# Patient Record
Sex: MALE | Race: BLACK OR AFRICAN AMERICAN | NOT HISPANIC OR LATINO | URBAN - METROPOLITAN AREA
[De-identification: names, ages, dates, MRNs, and addresses within clinical notes are randomized per-mention and may not be internally consistent; named-entity substitution may affect disease eponyms.]

---

## 2018-08-05 ENCOUNTER — EMERGENCY (EMERGENCY)
Facility: HOSPITAL | Age: 83
LOS: 1 days | Discharge: HOME | End: 2018-08-05
Attending: EMERGENCY MEDICINE
Payer: MEDICAID

## 2018-08-05 VITALS
TEMPERATURE: 98 F | OXYGEN SATURATION: 99 % | HEIGHT: 68 IN | HEART RATE: 79 BPM | RESPIRATION RATE: 16 BRPM | SYSTOLIC BLOOD PRESSURE: 175 MMHG | WEIGHT: 160.06 LBS | DIASTOLIC BLOOD PRESSURE: 97 MMHG

## 2018-08-05 VITALS
DIASTOLIC BLOOD PRESSURE: 97 MMHG | TEMPERATURE: 98 F | HEART RATE: 80 BPM | SYSTOLIC BLOOD PRESSURE: 185 MMHG | OXYGEN SATURATION: 99 % | RESPIRATION RATE: 18 BRPM

## 2018-08-05 LAB
APPEARANCE UR: CLEAR — SIGNIFICANT CHANGE UP
BILIRUB UR-MCNC: NEGATIVE — SIGNIFICANT CHANGE UP
COLOR SPEC: YELLOW — SIGNIFICANT CHANGE UP
DIFF PNL FLD: NEGATIVE — SIGNIFICANT CHANGE UP
GLUCOSE UR QL: NEGATIVE — SIGNIFICANT CHANGE UP
KETONES UR-MCNC: NEGATIVE — SIGNIFICANT CHANGE UP
LEUKOCYTE ESTERASE UR-ACNC: ABNORMAL
NITRITE UR-MCNC: NEGATIVE — SIGNIFICANT CHANGE UP
PH UR: 7 — SIGNIFICANT CHANGE UP (ref 5–8)
PROT UR-MCNC: NEGATIVE — SIGNIFICANT CHANGE UP
SP GR SPEC: 1.01 — SIGNIFICANT CHANGE UP (ref 1.01–1.02)
UROBILINOGEN FLD QL: NEGATIVE — SIGNIFICANT CHANGE UP

## 2018-08-05 PROCEDURE — 87086 URINE CULTURE/COLONY COUNT: CPT

## 2018-08-05 PROCEDURE — 99284 EMERGENCY DEPT VISIT MOD MDM: CPT

## 2018-08-05 PROCEDURE — 76870 US EXAM SCROTUM: CPT

## 2018-08-05 PROCEDURE — 81001 URINALYSIS AUTO W/SCOPE: CPT

## 2018-08-05 PROCEDURE — 76870 US EXAM SCROTUM: CPT | Mod: 26

## 2018-08-05 PROCEDURE — 99284 EMERGENCY DEPT VISIT MOD MDM: CPT | Mod: 25

## 2018-08-05 RX ORDER — IBUPROFEN 200 MG
400 TABLET ORAL ONCE
Qty: 0 | Refills: 0 | Status: COMPLETED | OUTPATIENT
Start: 2018-08-05 | End: 2018-08-05

## 2018-08-05 RX ORDER — CIPROFLOXACIN LACTATE 400MG/40ML
1 VIAL (ML) INTRAVENOUS
Qty: 7 | Refills: 0 | OUTPATIENT
Start: 2018-08-05 | End: 2018-08-11

## 2018-08-05 RX ORDER — AMLODIPINE BESYLATE 2.5 MG/1
5 TABLET ORAL ONCE
Qty: 0 | Refills: 0 | Status: COMPLETED | OUTPATIENT
Start: 2018-08-05 | End: 2018-08-05

## 2018-08-05 RX ADMIN — Medication 400 MILLIGRAM(S): at 13:30

## 2018-08-05 RX ADMIN — AMLODIPINE BESYLATE 5 MILLIGRAM(S): 2.5 TABLET ORAL at 13:30

## 2018-08-05 NOTE — ED PROVIDER NOTE - PSH
Epididymal cyst  6/2015:  Excised  Gastric ulcer  ' 2005:  surgically repaired  History of hydrocelectomy  6/ 2015:  Right  S/P cataract extraction  both eyes with  lense implant 3/2015

## 2018-08-05 NOTE — ED PROVIDER NOTE - OBJECTIVE STATEMENT
84 y/o M pt with a PMHx of Cataract of both eyes, Epididymal cyst, HTN, Hydrocele, and Stomach ulcer and a PSHx of Gastric ulcer, Hydrocelectomy, and Cataract extraction presents to ED with daughter c/o left testicular pain and swelling x2 days. Pt reports that he had similar sx in his right testicle 2 years ago and underwent a procedure for it. Pt denies blood in urination, difficulty urinating, vomiting, nausea, abd pain, fever, or any other complaints. Pt states occasional EtOH use. NKDA.

## 2018-08-05 NOTE — ED PROVIDER NOTE - PMH
Cataract of both eyes  3/2015: surgically treated  Current smoker  0.25 ppd X 45 years.  Uses Nicotine Patch currently  Epididymal cyst  6/2015: surgically treated  HTN (hypertension)  x  2 years, controlled  Hydrocele, right  6/2015. surgically treated  Stomach ulcer  ' 2005:  surgically treated

## 2018-08-05 NOTE — ED PROVIDER NOTE - MEDICAL DECISION MAKING DETAILS
82 y/o M pt c/o left testicular pain and swelling x2 days. But exam appears normal, will check UA, urine culture, testicular US, and reevaluate

## 2018-08-05 NOTE — ED ADULT NURSE NOTE - NSIMPLEMENTINTERV_GEN_ALL_ED
Implemented All Universal Safety Interventions:  [place] to call system. Call bell, personal items and telephone within reach. Instruct patient to call for assistance. Room bathroom lighting operational. Non-slip footwear when patient is off stretcher. Physically safe environment: no spills, clutter or unnecessary equipment. Stretcher in lowest position, wheels locked, appropriate side rails in place.

## 2018-08-05 NOTE — ED PROVIDER NOTE - PROGRESS NOTE DETAILS
No significant abnormality on US.  Suspect UTI and will prescribe Levaquin.  Advised strict return precautions and PMD f/u.

## 2018-08-06 LAB
CULTURE RESULTS: SIGNIFICANT CHANGE UP
SPECIMEN SOURCE: SIGNIFICANT CHANGE UP

## 2018-11-09 ENCOUNTER — OUTPATIENT (OUTPATIENT)
Dept: OUTPATIENT SERVICES | Facility: HOSPITAL | Age: 83
LOS: 1 days | End: 2018-11-09
Payer: MEDICAID

## 2018-11-09 ENCOUNTER — APPOINTMENT (OUTPATIENT)
Dept: UROLOGY | Facility: CLINIC | Age: 83
End: 2018-11-09

## 2018-11-09 DIAGNOSIS — R35.0 FREQUENCY OF MICTURITION: ICD-10-CM

## 2018-11-09 DIAGNOSIS — N43.3 HYDROCELE, UNSPECIFIED: ICD-10-CM

## 2018-11-09 PROCEDURE — G0463: CPT

## 2018-11-12 PROBLEM — N43.3 HYDROCELE IN ADULT: Status: ACTIVE | Noted: 2018-11-12

## 2018-11-29 ENCOUNTER — OTHER (OUTPATIENT)
Age: 83
End: 2018-11-29

## 2018-12-14 ENCOUNTER — OUTPATIENT (OUTPATIENT)
Dept: OUTPATIENT SERVICES | Facility: HOSPITAL | Age: 83
LOS: 1 days | End: 2018-12-14
Payer: MEDICAID

## 2018-12-14 VITALS
SYSTOLIC BLOOD PRESSURE: 120 MMHG | WEIGHT: 181 LBS | RESPIRATION RATE: 14 BRPM | DIASTOLIC BLOOD PRESSURE: 80 MMHG | TEMPERATURE: 97 F | HEIGHT: 67 IN | OXYGEN SATURATION: 98 % | HEART RATE: 98 BPM

## 2018-12-14 DIAGNOSIS — R06.83 SNORING: ICD-10-CM

## 2018-12-14 DIAGNOSIS — N43.3 HYDROCELE, UNSPECIFIED: ICD-10-CM

## 2018-12-14 DIAGNOSIS — I10 ESSENTIAL (PRIMARY) HYPERTENSION: ICD-10-CM

## 2018-12-14 LAB
BUN SERPL-MCNC: 20 MG/DL — SIGNIFICANT CHANGE UP (ref 7–23)
CALCIUM SERPL-MCNC: 9.3 MG/DL — SIGNIFICANT CHANGE UP (ref 8.4–10.5)
CHLORIDE SERPL-SCNC: 101 MMOL/L — SIGNIFICANT CHANGE UP (ref 98–107)
CO2 SERPL-SCNC: 27 MMOL/L — SIGNIFICANT CHANGE UP (ref 22–31)
CREAT SERPL-MCNC: 1.1 MG/DL — SIGNIFICANT CHANGE UP (ref 0.5–1.3)
GLUCOSE SERPL-MCNC: 106 MG/DL — HIGH (ref 70–99)
HBA1C BLD-MCNC: 6.5 % — HIGH (ref 4–5.6)
HCT VFR BLD CALC: 39.8 % — SIGNIFICANT CHANGE UP (ref 39–50)
HGB BLD-MCNC: 13.2 G/DL — SIGNIFICANT CHANGE UP (ref 13–17)
MCHC RBC-ENTMCNC: 31.3 PG — SIGNIFICANT CHANGE UP (ref 27–34)
MCHC RBC-ENTMCNC: 33.2 % — SIGNIFICANT CHANGE UP (ref 32–36)
MCV RBC AUTO: 94.3 FL — SIGNIFICANT CHANGE UP (ref 80–100)
NRBC # FLD: 0 — SIGNIFICANT CHANGE UP
PLATELET # BLD AUTO: 211 K/UL — SIGNIFICANT CHANGE UP (ref 150–400)
PMV BLD: 9.9 FL — SIGNIFICANT CHANGE UP (ref 7–13)
POTASSIUM SERPL-MCNC: 3.9 MMOL/L — SIGNIFICANT CHANGE UP (ref 3.5–5.3)
POTASSIUM SERPL-SCNC: 3.9 MMOL/L — SIGNIFICANT CHANGE UP (ref 3.5–5.3)
RBC # BLD: 4.22 M/UL — SIGNIFICANT CHANGE UP (ref 4.2–5.8)
RBC # FLD: 13.8 % — SIGNIFICANT CHANGE UP (ref 10.3–14.5)
SODIUM SERPL-SCNC: 140 MMOL/L — SIGNIFICANT CHANGE UP (ref 135–145)
WBC # BLD: 5.74 K/UL — SIGNIFICANT CHANGE UP (ref 3.8–10.5)
WBC # FLD AUTO: 5.74 K/UL — SIGNIFICANT CHANGE UP (ref 3.8–10.5)

## 2018-12-14 PROCEDURE — 93010 ELECTROCARDIOGRAM REPORT: CPT

## 2018-12-14 RX ORDER — SODIUM CHLORIDE 9 MG/ML
1000 INJECTION, SOLUTION INTRAVENOUS
Qty: 0 | Refills: 0 | Status: DISCONTINUED | OUTPATIENT
Start: 2018-12-31 | End: 2019-01-15

## 2018-12-14 NOTE — H&P PST ADULT - NEGATIVE ENMT SYMPTOMS
no ear pain/no hearing difficulty/no sinus symptoms/no nasal congestion/no nasal obstruction/no post-nasal discharge/no nose bleeds/no abnormal taste sensation/no dry mouth/no vertigo/no gum bleeding/no throat pain/no recurrent cold sores/no tinnitus/no nasal discharge/no dysphagia

## 2018-12-14 NOTE — H&P PST ADULT - PMH
Cataract of both eyes  3/2015: surgically treated  Current smoker  0.25 ppd X 45 years.  Uses Nicotine Patch currently  Diabetes    Epididymal cyst  6/2015: surgically treated  HTN (hypertension)  x  2 years, controlled  Hydrocele, right  6/2015. surgically treated  Hydrocele, unspecified    Stomach ulcer  ' 2005:  surgically treated

## 2018-12-14 NOTE — H&P PST ADULT - NSANTHOSAYNRD_GEN_A_CORE
No. KELVIN screening performed.  STOP BANG Legend: 0-2 = LOW Risk; 3-4 = INTERMEDIATE Risk; 5-8 = HIGH Risk

## 2018-12-14 NOTE — H&P PST ADULT - NEGATIVE ALLERGY TYPES
no outdoor environmental allergies/no indoor environmental allergies/no reactions to medicines/no reactions to food/no reactions to insect bites/no reactions to animals

## 2018-12-14 NOTE — H&P PST ADULT - MUSCULOSKELETAL
details… detailed exam normal strength/no joint erythema/no joint warmth/no calf tenderness/no joint swelling/ROM intact

## 2018-12-14 NOTE — H&P PST ADULT - HISTORY OF PRESENT ILLNESS
This is a 83 year old male with a past medical history of : HTN, newly diagnosed with diabetes / Gastric Ulcer (surgically treated ' 05). s/p ( 6/2015): Right Hydrocelectomy/ Excision Left Epididymal Cyst. Presented to his medical Doctor with complaints of left scrotal swelling started approximately 8 weeks ago. Patient was referred to Dr. George for an evaluation. Pre op diagnosis Hydrocele, unspecified. Patient is scheduled for left hydrocelectomy scheduled for 12/31/2018.

## 2018-12-14 NOTE — H&P PST ADULT - NEGATIVE GENERAL GENITOURINARY SYMPTOMS
no bladder infections/no urinary hesitancy/no hematuria/no flank pain R/no incontinence/normal urinary frequency/no flank pain L/no urine discoloration/no dysuria

## 2018-12-14 NOTE — H&P PST ADULT - RS GEN PE MLT RESP DETAILS PC
no wheezes/respirations non-labored/breath sounds equal/no rales/airway patent/clear to auscultation bilaterally/good air movement/no rhonchi

## 2018-12-14 NOTE — H&P PST ADULT - NEGATIVE NEUROLOGICAL SYMPTOMS
no generalized seizures/no focal seizures/no transient paralysis/no syncope/no vertigo/no loss of consciousness/no paresthesias/no headache/no confusion/no tremors/no facial palsy/no weakness/no loss of sensation/no difficulty walking

## 2018-12-14 NOTE — H&P PST ADULT - NEGATIVE GENERAL SYMPTOMS
no sweating/no anorexia/no weight loss/no chills/no fever/no weight gain/no polyphagia/no polyuria/no polydipsia/no fatigue

## 2018-12-14 NOTE — H&P PST ADULT - ASSESSMENT
Pre op diagnosis Hydrocele, unspecified. Patient is scheduled for left hydrocelectomy scheduled for 12/31/2018.

## 2018-12-14 NOTE — H&P PST ADULT - NEGATIVE MUSCULOSKELETAL SYMPTOMS
no back pain/no leg pain R/no myalgia/no muscle cramps/no neck pain/no arm pain L/no arthritis/no stiffness/no arm pain R/no leg pain L/no joint swelling/no muscle weakness

## 2018-12-14 NOTE — H&P PST ADULT - PROBLEM SELECTOR PLAN 1
Patient is scheduled for left hydrocelectomy scheduled for 12/31/2018.     Preop instructions, pepcid, provided. Pt stated understanding.    Pending medical evaluation per Surgeon and PST- Patient is a poor historian-Dr. Hicks [phone]. (Patient not sure if he has Diabetes).    Pending labs

## 2018-12-17 PROBLEM — N43.3 HYDROCELE, UNSPECIFIED: Chronic | Status: ACTIVE | Noted: 2018-12-14

## 2018-12-17 PROBLEM — E11.9 TYPE 2 DIABETES MELLITUS WITHOUT COMPLICATIONS: Chronic | Status: ACTIVE | Noted: 2018-12-14

## 2018-12-31 ENCOUNTER — OUTPATIENT (OUTPATIENT)
Dept: OUTPATIENT SERVICES | Facility: HOSPITAL | Age: 83
LOS: 1 days | Discharge: HOME | End: 2018-12-31

## 2018-12-31 ENCOUNTER — APPOINTMENT (OUTPATIENT)
Dept: UROLOGY | Facility: HOSPITAL | Age: 83
End: 2018-12-31

## 2018-12-31 VITALS
HEART RATE: 68 BPM | HEIGHT: 67 IN | DIASTOLIC BLOOD PRESSURE: 86 MMHG | RESPIRATION RATE: 16 BRPM | OXYGEN SATURATION: 95 % | SYSTOLIC BLOOD PRESSURE: 128 MMHG | TEMPERATURE: 98 F | WEIGHT: 181 LBS

## 2018-12-31 DIAGNOSIS — N43.3 HYDROCELE, UNSPECIFIED: ICD-10-CM

## 2018-12-31 LAB — GLUCOSE BLDC GLUCOMTR-MCNC: 100 MG/DL — HIGH (ref 70–99)

## 2018-12-31 NOTE — ASU PREOP CHECKLIST - TO WHOM
OR cancelled by Dr George "patient asymptomatic. No need for surgery today "IV d/c patient given apple juice and crackers. Patient left with family. at 12:05 pm

## 2022-02-01 NOTE — ASU PATIENT PROFILE, ADULT - PSH
A (Catheter 4fr Omni Crv 65cm Radopq Flush Htorq) catheter was inserted.   Epididymal cyst  6/2015:  Excised  Gastric ulcer  ' 2005:  surgically repaired  History of hydrocelectomy  6/ 2015:  Right  S/P cataract extraction  both eyes with  lense implant 3/2015

## 2022-07-29 NOTE — ED PROVIDER NOTE - CPE EDP MUSC NORM
Reason for visit: post op      Dx/Hx/Sx: left ureteral stones     Records/imaging/labs/orders: XR KUB and renal US in epic    At Rooming: video visit    normal...